# Patient Record
Sex: MALE | Race: WHITE | NOT HISPANIC OR LATINO | URBAN - METROPOLITAN AREA
[De-identification: names, ages, dates, MRNs, and addresses within clinical notes are randomized per-mention and may not be internally consistent; named-entity substitution may affect disease eponyms.]

---

## 2024-05-09 DIAGNOSIS — Z31.441 ENCOUNTER FOR TESTING OF MALE PARTNER OF FEMALE WITH RECURRENT PREGNANCY LOSS: ICD-10-CM

## 2024-05-09 NOTE — PROGRESS NOTES
Patient consented to chromosome analysis in the context of his reproductive partner's genetic counseling appointment for recurrent pregnancy loss. Prior auth obtained; see scanned approval letter.   Catherine Duran MS, [facility]

## 2024-05-20 ENCOUNTER — LAB (OUTPATIENT)
Dept: LAB | Facility: LAB | Age: 29
End: 2024-05-20
Payer: COMMERCIAL

## 2024-05-20 DIAGNOSIS — Z31.441 ENCOUNTER FOR TESTING OF MALE PARTNER OF FEMALE WITH RECURRENT PREGNANCY LOSS: ICD-10-CM

## 2024-07-03 LAB
BAND RESOLUTION: 550 BANDS
CHROM ANALY OVERALL INTERP-IMP: NORMAL
CHROMOSOME ANALYSIS CELLS ANALYZED: 5 CELLS
CHROMOSOME ANALYSIS CELLS IMAGED: 5 CELLS
CHROMOSOME ANALYSIS HYPERMODAL CELL COUNT: 0 CELLS
CHROMOSOME ANALYSIS HYPOMODAL CELL COUNT: 0 CELLS
CHROMOSOME ANALYSIS MODAL CHROMOSOME NO: 46 CHROMOSOMES
CHROMOSOME ANALYSIS STAINING METHOD: NORMAL
ELECTRONICALLY SIGNED BY CYTOGENETICS: NORMAL
KARYOTYP BLD/T: 3 CELLS
TOTAL CELLS COUNTED BLD: 20 CELLS